# Patient Record
Sex: MALE | Race: WHITE
[De-identification: names, ages, dates, MRNs, and addresses within clinical notes are randomized per-mention and may not be internally consistent; named-entity substitution may affect disease eponyms.]

---

## 2021-04-25 ENCOUNTER — HOSPITAL ENCOUNTER (EMERGENCY)
Dept: HOSPITAL 54 - ER | Age: 66
Discharge: ANOTHER HOSPITAL | End: 2021-04-25
Payer: COMMERCIAL

## 2021-04-25 VITALS — DIASTOLIC BLOOD PRESSURE: 93 MMHG | SYSTOLIC BLOOD PRESSURE: 148 MMHG

## 2021-04-25 VITALS — BODY MASS INDEX: 24.38 KG/M2 | HEIGHT: 72 IN | WEIGHT: 180 LBS

## 2021-04-25 DIAGNOSIS — I62.01: Primary | ICD-10-CM

## 2021-04-25 DIAGNOSIS — Z20.822: ICD-10-CM

## 2021-04-25 DIAGNOSIS — R51.9: ICD-10-CM

## 2021-04-25 LAB
BASOPHILS # BLD AUTO: 0.1 /CMM (ref 0–0.2)
BASOPHILS NFR BLD AUTO: 0.8 % (ref 0–2)
BUN SERPL-MCNC: 20 MG/DL (ref 7–18)
CALCIUM SERPL-MCNC: 9.4 MG/DL (ref 8.5–10.1)
CHLORIDE SERPL-SCNC: 104 MMOL/L (ref 98–107)
CO2 SERPL-SCNC: 28 MMOL/L (ref 21–32)
CREAT SERPL-MCNC: 0.9 MG/DL (ref 0.6–1.3)
EOSINOPHIL NFR BLD AUTO: 1 % (ref 0–6)
GLUCOSE SERPL-MCNC: 104 MG/DL (ref 74–106)
HCT VFR BLD AUTO: 44 % (ref 39–51)
HGB BLD-MCNC: 14.6 G/DL (ref 13.5–17.5)
LYMPHOCYTES NFR BLD AUTO: 1.9 /CMM (ref 0.8–4.8)
LYMPHOCYTES NFR BLD AUTO: 29.3 % (ref 20–44)
MCHC RBC AUTO-ENTMCNC: 33 G/DL (ref 31–36)
MCV RBC AUTO: 89 FL (ref 80–96)
MONOCYTES NFR BLD AUTO: 0.6 /CMM (ref 0.1–1.3)
MONOCYTES NFR BLD AUTO: 9 % (ref 2–12)
NEUTROPHILS # BLD AUTO: 4 /CMM (ref 1.8–8.9)
NEUTROPHILS NFR BLD AUTO: 59.9 % (ref 43–81)
PLATELET # BLD AUTO: 278 /CMM (ref 150–450)
POTASSIUM SERPL-SCNC: 4.3 MMOL/L (ref 3.5–5.1)
RBC # BLD AUTO: 4.92 MIL/UL (ref 4.5–6)
SODIUM SERPL-SCNC: 140 MMOL/L (ref 136–145)
WBC NRBC COR # BLD AUTO: 6.6 K/UL (ref 4.3–11)

## 2021-04-25 PROCEDURE — 80048 BASIC METABOLIC PNL TOTAL CA: CPT

## 2021-04-25 PROCEDURE — 96372 THER/PROPH/DIAG INJ SC/IM: CPT

## 2021-04-25 PROCEDURE — C9803 HOPD COVID-19 SPEC COLLECT: HCPCS

## 2021-04-25 PROCEDURE — 96375 TX/PRO/DX INJ NEW DRUG ADDON: CPT

## 2021-04-25 PROCEDURE — 85025 COMPLETE CBC W/AUTO DIFF WBC: CPT

## 2021-04-25 PROCEDURE — 36415 COLL VENOUS BLD VENIPUNCTURE: CPT

## 2021-04-25 PROCEDURE — 96365 THER/PROPH/DIAG IV INF INIT: CPT

## 2021-04-25 PROCEDURE — 99291 CRITICAL CARE FIRST HOUR: CPT

## 2021-04-25 PROCEDURE — 87426 SARSCOV CORONAVIRUS AG IA: CPT

## 2021-04-25 PROCEDURE — 99292 CRITICAL CARE ADDL 30 MIN: CPT

## 2021-04-25 PROCEDURE — 70450 CT HEAD/BRAIN W/O DYE: CPT

## 2021-04-25 NOTE — NUR
TRANSFER INFORMATION:

PT ACCEPTED AT [facility]

ACCEPTING MD ENRIQUEZ

PHONE# FOR REPORT [phone]

## 2021-04-25 NOTE — NUR
Patient awake alert denies numdness to arms and legs ,no facila drooping no 
slurred speech swallow eval passed

## 2021-04-25 NOTE — NUR
Patient GCS 15 awake alert noted Left pupil 3mm reacted to light and right 
pupil 2mm reacted to light pain level 5/10 headache

## 2021-04-25 NOTE — NUR
Patient awake alert denies sob or numbness good bilateral hand grasp and good 
pedal pushes noted able to ambulated to bathroom patient aware of transfer .

## 2021-05-19 ENCOUNTER — HOSPITAL ENCOUNTER (INPATIENT)
Dept: HOSPITAL 54 - ER | Age: 66
Discharge: LEFT BEFORE DISCHARGE | DRG: 100 | End: 2021-05-19
Attending: FAMILY MEDICINE | Admitting: FAMILY MEDICINE
Payer: COMMERCIAL

## 2021-05-19 VITALS — SYSTOLIC BLOOD PRESSURE: 130 MMHG | DIASTOLIC BLOOD PRESSURE: 77 MMHG

## 2021-05-19 VITALS — WEIGHT: 170 LBS | BODY MASS INDEX: 23.03 KG/M2 | HEIGHT: 72 IN

## 2021-05-19 VITALS — DIASTOLIC BLOOD PRESSURE: 79 MMHG | SYSTOLIC BLOOD PRESSURE: 133 MMHG

## 2021-05-19 DIAGNOSIS — Z79.899: ICD-10-CM

## 2021-05-19 DIAGNOSIS — E87.2: ICD-10-CM

## 2021-05-19 DIAGNOSIS — R73.9: ICD-10-CM

## 2021-05-19 DIAGNOSIS — Z98.890: ICD-10-CM

## 2021-05-19 DIAGNOSIS — E78.5: ICD-10-CM

## 2021-05-19 DIAGNOSIS — N40.0: ICD-10-CM

## 2021-05-19 DIAGNOSIS — D72.829: ICD-10-CM
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
## 2021-05-19 DIAGNOSIS — N17.0: ICD-10-CM

## 2021-05-19 DIAGNOSIS — I10: ICD-10-CM

## 2021-05-19 DIAGNOSIS — E80.6: ICD-10-CM

## 2021-05-19 DIAGNOSIS — R56.9: Primary | ICD-10-CM

## 2021-05-19 DIAGNOSIS — E87.6: ICD-10-CM

## 2021-05-19 DIAGNOSIS — T42.75XA: ICD-10-CM

## 2021-05-19 DIAGNOSIS — Z86.73: ICD-10-CM

## 2021-05-19 LAB
ALBUMIN SERPL BCP-MCNC: 4.3 G/DL (ref 3.4–5)
ALP SERPL-CCNC: 102 U/L (ref 46–116)
ALT SERPL W P-5'-P-CCNC: 27 U/L (ref 12–78)
AST SERPL W P-5'-P-CCNC: 21 U/L (ref 15–37)
BASOPHILS # BLD AUTO: 0.1 /CMM (ref 0–0.2)
BASOPHILS NFR BLD AUTO: 0.8 % (ref 0–2)
BILIRUB DIRECT SERPL-MCNC: 0.3 MG/DL (ref 0–0.2)
BILIRUB SERPL-MCNC: 1.2 MG/DL (ref 0.2–1)
BUN SERPL-MCNC: 15 MG/DL (ref 7–18)
CALCIUM SERPL-MCNC: 9.9 MG/DL (ref 8.5–10.1)
CHLORIDE SERPL-SCNC: 100 MMOL/L (ref 98–107)
CO2 SERPL-SCNC: 14 MMOL/L (ref 21–32)
CREAT SERPL-MCNC: 1.4 MG/DL (ref 0.6–1.3)
EOSINOPHIL NFR BLD AUTO: 1.1 % (ref 0–6)
GLUCOSE SERPL-MCNC: 170 MG/DL (ref 74–106)
HCT VFR BLD AUTO: 46 % (ref 39–51)
HGB BLD-MCNC: 14.7 G/DL (ref 13.5–17.5)
LYMPHOCYTES NFR BLD AUTO: 4.7 /CMM (ref 0.8–4.8)
LYMPHOCYTES NFR BLD AUTO: 42.3 % (ref 20–44)
MCHC RBC AUTO-ENTMCNC: 32 G/DL (ref 31–36)
MCV RBC AUTO: 91 FL (ref 80–96)
MONOCYTES NFR BLD AUTO: 0.9 /CMM (ref 0.1–1.3)
MONOCYTES NFR BLD AUTO: 7.9 % (ref 2–12)
NEUTROPHILS # BLD AUTO: 5.3 /CMM (ref 1.8–8.9)
NEUTROPHILS NFR BLD AUTO: 47.9 % (ref 43–81)
PLATELET # BLD AUTO: 412 /CMM (ref 150–450)
POTASSIUM SERPL-SCNC: 3.1 MMOL/L (ref 3.5–5.1)
PROT SERPL-MCNC: 9.1 G/DL (ref 6.4–8.2)
RBC # BLD AUTO: 5.04 MIL/UL (ref 4.5–6)
SODIUM SERPL-SCNC: 141 MMOL/L (ref 136–145)
WBC NRBC COR # BLD AUTO: 11.1 K/UL (ref 4.3–11)

## 2021-05-19 PROCEDURE — G0480 DRUG TEST DEF 1-7 CLASSES: HCPCS

## 2021-05-19 PROCEDURE — C9803 HOPD COVID-19 SPEC COLLECT: HCPCS

## 2021-05-19 PROCEDURE — G0378 HOSPITAL OBSERVATION PER HR: HCPCS

## 2021-05-19 NOTE — NUR
PATIENT ADMITTED FROM ER REPORTED BY MARINA BROWN. ADMITTING DX IS SEIZURE, STABLE VITAL SIGN, 
SKIN IS WARM TO TOUCH, KEEP CLEAN/IVONE. CALL LIGHT WITHIN REACH, WILL CONTINUE TO MONITOR.

## 2021-05-19 NOTE — NUR
TELE RN NOTES

RECEIVED ON BED SLEEPING,AROUSABLE TO VERBAL STIMULI,BREATHING REGULAR,NOT IN ANY FORM OF 
DISTRESS,PRESENT IVF INFUSING WELL VIA IV PUMP ON RIGHT WRIST,SITE PATENT.SIDE RAILS PADDED 
FOR SEIZURE PRECAUTION.FALL PRECAUTION OBSERVED,BED ON LOWEST POSITION AND LOCKED,BED ALARM 
TRIGGERED.CALL LIGHT IN TREACH,NEEDS ANTICIPATED.

## 2021-05-19 NOTE — NUR
RN OPENING NOTES:



RECEIVED RESIDENT SLEEP BED COMFORTABLY,AROUSABLE TO STIMULI,  BED IN LOW POSITION, CALL 
LIGHTS WITHIN REACH, NO COMPLAIN OF PAIN AND DISCOMFORT, RESIDENT IS MONITORED FOR SEIZURE,  
NPO AT THIS TIME,  ON TELE MONITORING WITH READING OF SINUS RTHYM HR AT 82,NO COMPLAIN OF 
PAIN AND DISCOMFORT AT THIS TIME WILL CONTINUE TO MONITOR.

## 2021-05-19 NOTE — NUR
TELE RN NOTES

AWAKE THIS TIME ASKING FOR DRINKING WATER.HE WAS INFORMED THAT HE WAS PUT ON NOTHING BY 
MOUTH BY ADMITTING DOCTOR,FEELING UPSET AND INSISTING TO HAVE WATER.ALSO ASKING FOR HIS IPOD 
,MONEY, AND CELLPHONE.BELONGING LIST WAS CHECK AND NOTHING WAS DECLARED,VERIFIED WITH 
LYDIA ROOT NURSE AND SAID NOTHING WAS KEPT IN THEIR DEPARTMENT. PATIENT DECIDED TO TO GO 
HOME AGAINST MEDICAL ADVICE.

## 2021-05-19 NOTE — NUR
TELE RN NOTES

NOTED PATIENT ON NPO STATUS,WITH KEPPRA 500MG PO Q 12,HOSPITALIST EDGAR VILLEGAS MADE AWARE,WITH 
ORDER TO CHANGE IT TO IV 500MG IV Q 12,NOTED AND CARRIED OUT.

## 2021-05-19 NOTE — NUR
bib friend, pt is c/o dizziness started 1 hour ago. Patient a/ox3, breathing 
even and unlabored, verbally responsive, answer questions with delayed 
response. Patient noted to be tachycardic on the monitor.

## 2021-05-19 NOTE — NUR
PATIENT TRANSFERRED TO ROOM 307-1 VIA ACLS PROTOCOL. NO DISTRESS NOTED. IN 
STABLE CONDITION, PATIENT IS MORE AWAKE. ENDORSED TO WARD BROWN.

## 2021-05-19 NOTE — NUR
RN TELE NOTES:



PATIENT WAS IN ROOM AWAKE AND ASKING FOR WATER, INFORM PATIENT THAT HE WAS ON  NPO, AT THIS 
TIME PER DOCTORS ORDER AND WILL REACH  OUT  TO  THE DOCTOR TO SEE IF HE CAN HAVE A SIP OF 
WATER OR ICE CHIPS, PATIENT WAS UPSET TOO BECAUSE HE WAS LOOKING FOR HIS PHONE AND  IPOD 
EXPLAIN TO HIM THAT  SAID ITEM WAS NOT PART OF THE INVENTORY LIST AND HE INSIST THAT ITS 
MISSING HE SAID THAT IT WAS THE SAMEEXCUSE AS YEARDAY AND THAT WE STOLE IT, HE SAID THAT IT 
WAS WITH THE PERSON WHO BROUGHT HIM TO ER  WE ASKED HIM THE NUMBER BUT HE COULDNT RECALL 
SAYING THAT HOW CAN IREMEMBER IT WHEN HE HAD A RECENT SURGERY, NO ANY CONTACT NUMBER TO 
NEAREST KIN  ON PATIENT CHART AND HE COULDNT  REMEMBER  ANY EITHER, PATIENT WASNTS TO GO 
AMA,  NOTIFY CHARGE NURSE AND SPOKE AND EXPLAIN TO DR LALAN, DR CUMMINGS CAME TO TALK TO HIM AND 
TRY TO CONVINCE HIM THAT HE CAN STAY HERE, AND AND WE CAN ARRANGE WITH THE  ON 
HOW TO REACH OUT WITH THE FAMILY, THE PATIENT WANTED TO LEAVE, HE WAS ALERT AND ORIENTED X 
4, KNOW HIS ADDRESS,  EXPLAINED THE RISK OF LEAVING SPECIALLY HE HAS HISTORY OF SEIZURE 
BUT HE INSIST, SIGNED AMA FORM REMOVED BAND AND ESCORT THE PATIENT TO ER, AND SEND HIM TOER  
SINCE HE WANTS TO TALK TO ER STAFF ABOUT THE INFORMATION AND HIS  GADGET WHICH ACCORDING TO 
HIM WAS WITH HIM.

## 2021-05-19 NOTE — NUR
PATIENT HAD AN EPISODE OF TONIC CLONIC SEIZURE X 5 SECS. DR. GRAHAM MADE AWARE. 
SEIZURE PRECAUTION OBSERVED.

## 2021-05-19 NOTE — NUR
RN CLOSING NOTE



PATIENT IN BED RESTING, REMAINS AO X 3. SKIN IS WARM TO TOUCH, KEEP CLEAN/DRY, INTACT IV 
SITE, RUNNING NS  ML/HR CURRENTLY.  RESPIRATORY EVEN AND UNLABORED ON ROOM AIR O2SAT 
94%. KEPT ELEVATED HOB FOR ENSURE AIRWAY AND ASPIRATION PRECAUTION, ALSO LOWEST BED POSITION 
FOR SAFETY. CALL LIGHT WITHIN REACH, WILL ENDORSE NIGHT SHIFT.

## 2021-05-19 NOTE — NUR
PATIENT NOTED URINARY RETENTION  1,000 CC IN BLADDER AND OUT 1,000 CC APPROXIMATELY.  
PATIENT MENTIONED PATIENT USE COUDE CATHETER EVERY 6 HOURS AT HOME. PAGED MD BUT NO RESPONSE 
AT THIS TIME. 

LABORATORY CALLED REGARDING 2ND LACTIC ACID LEVEL IS 1.1, AND WILL CANCELL 3RD OF LACTIC 
ACID LAB. 

-------------------------------------------------------------------------------

Addendum: 05/19/21 at 1726 by IVY CONNOLLY RN

-------------------------------------------------------------------------------

PAGED DR. BASS AND CALLED BACK. INFORMED OF PATIENT'S HISTORY OF SELF-CATHETERIZATION 
USING COUDE CATHETER AND BLADDER SCAN RESULT OF >999CC W/ RECOMMENDATION TO DO STRAIGHT 
CATHETERIZATION. PER MD, OKAY TO DO STRAIGHT CATHETERIZATION TO BE PERFORMED BY NURSE BUT 
OKAY FOR PATIENT TO DO IT AS WELL AS LONG AS PATIENT IS SUPERVISED. PATIENT ASSISTED W/ 
CATHETERIZATION IN THE BATHROOM USING FR 18 COUDE CATHETER W/ OUTPUT OF APPROXIMATELY 
1000CC. PATIENT VERBALIZED BLADDER RELIEF AFTER PROCEDURE. NOTED W/ YELLOW-COLORED URINE 
OUTPUT.

## 2021-05-19 NOTE — NUR
DR. GRAHAM GAVE VERBAL ORDER FOR VALIUM. REMOVED VALIUM FROM OMNICELL, THEN DR. GRAHAM GAVE INSTRUCTION TO HOLD THE VALIUM AT THIS TIME.

## 2021-05-20 ENCOUNTER — HOSPITAL ENCOUNTER (INPATIENT)
Dept: HOSPITAL 12 - ER | Age: 66
LOS: 1 days | Discharge: HOME | DRG: 101 | End: 2021-05-21
Attending: INTERNAL MEDICINE | Admitting: INTERNAL MEDICINE
Payer: COMMERCIAL

## 2021-05-20 VITALS — SYSTOLIC BLOOD PRESSURE: 122 MMHG | DIASTOLIC BLOOD PRESSURE: 87 MMHG

## 2021-05-20 VITALS — HEIGHT: 72 IN | BODY MASS INDEX: 23.88 KG/M2 | WEIGHT: 176.31 LBS

## 2021-05-20 VITALS — DIASTOLIC BLOOD PRESSURE: 104 MMHG | SYSTOLIC BLOOD PRESSURE: 140 MMHG

## 2021-05-20 DIAGNOSIS — N40.0: ICD-10-CM

## 2021-05-20 DIAGNOSIS — R56.9: Primary | ICD-10-CM

## 2021-05-20 DIAGNOSIS — S06.5X0D: ICD-10-CM

## 2021-05-20 DIAGNOSIS — F41.9: ICD-10-CM

## 2021-05-20 DIAGNOSIS — I10: ICD-10-CM

## 2021-05-20 DIAGNOSIS — E78.5: ICD-10-CM

## 2021-05-20 DIAGNOSIS — Z20.822: ICD-10-CM
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
## 2021-05-20 DIAGNOSIS — S06.5X0S: ICD-10-CM

## 2021-05-20 DIAGNOSIS — Z87.820: ICD-10-CM
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
## 2021-05-20 DIAGNOSIS — E87.6: ICD-10-CM

## 2021-05-20 DIAGNOSIS — Z98.890: ICD-10-CM

## 2021-05-20 LAB
BASOPHILS # BLD AUTO: 0.1 K/UL (ref 0–8)
BASOPHILS NFR BLD AUTO: 0.6 % (ref 0–2)
BUN SERPL-MCNC: 12 MG/DL (ref 7–18)
CHLORIDE SERPL-SCNC: 104 MMOL/L (ref 98–107)
CO2 SERPL-SCNC: 23 MMOL/L (ref 21–32)
CREAT SERPL-MCNC: 1 MG/DL (ref 0.6–1.3)
EOSINOPHIL # BLD AUTO: 0 K/UL (ref 0–0.7)
EOSINOPHIL NFR BLD AUTO: 0.4 % (ref 0–7)
GLUCOSE SERPL-MCNC: 120 MG/DL (ref 74–106)
HCT VFR BLD AUTO: 38.3 % (ref 36.7–47.1)
HGB BLD-MCNC: 13 G/DL (ref 12.5–16.3)
LYMPHOCYTES # BLD AUTO: 1.2 K/UL (ref 20–40)
LYMPHOCYTES NFR BLD AUTO: 14.7 % (ref 20.5–51.5)
MCH RBC QN AUTO: 29.4 UUG (ref 23.8–33.4)
MCHC RBC AUTO-ENTMCNC: 34 G/DL (ref 32.5–36.3)
MCV RBC AUTO: 87 FL (ref 73–96.2)
MONOCYTES # BLD AUTO: 0.7 K/UL (ref 2–10)
MONOCYTES NFR BLD AUTO: 8.9 % (ref 0–11)
NEUTROPHILS # BLD AUTO: 6.3 K/UL (ref 1.8–8.9)
NEUTROPHILS NFR BLD AUTO: 75.4 % (ref 38.5–71.5)
PLATELET # BLD AUTO: 264 K/UL (ref 152–348)
POTASSIUM SERPL-SCNC: 3.2 MMOL/L (ref 3.5–5.1)
RBC # BLD AUTO: 4.4 MIL/UL (ref 4.06–5.63)
WBC # BLD AUTO: 8.4 K/UL (ref 3.6–10.2)

## 2021-05-20 PROCEDURE — G0378 HOSPITAL OBSERVATION PER HR: HCPCS

## 2021-05-20 PROCEDURE — A4663 DIALYSIS BLOOD PRESSURE CUFF: HCPCS

## 2021-05-20 RX ADMIN — Medication SCH MG: at 19:01

## 2021-05-21 VITALS — DIASTOLIC BLOOD PRESSURE: 80 MMHG | SYSTOLIC BLOOD PRESSURE: 129 MMHG

## 2021-05-21 VITALS — DIASTOLIC BLOOD PRESSURE: 85 MMHG | SYSTOLIC BLOOD PRESSURE: 140 MMHG

## 2021-05-21 VITALS — DIASTOLIC BLOOD PRESSURE: 93 MMHG | SYSTOLIC BLOOD PRESSURE: 133 MMHG

## 2021-05-21 LAB
ALP SERPL-CCNC: 96 U/L (ref 50–136)
ALT SERPL W/O P-5'-P-CCNC: 31 U/L (ref 16–63)
AST SERPL-CCNC: 41 U/L (ref 15–37)
BASOPHILS # BLD AUTO: 0.1 K/UL (ref 0–8)
BASOPHILS NFR BLD AUTO: 0.7 % (ref 0–2)
BILIRUB SERPL-MCNC: 1.2 MG/DL (ref 0.2–1)
BUN SERPL-MCNC: 14 MG/DL (ref 7–18)
CHLORIDE SERPL-SCNC: 104 MMOL/L (ref 98–107)
CHOLEST SERPL-MCNC: 132 MG/DL (ref ?–200)
CO2 SERPL-SCNC: 29 MMOL/L (ref 21–32)
CREAT SERPL-MCNC: 1.1 MG/DL (ref 0.6–1.3)
EOSINOPHIL # BLD AUTO: 0.1 K/UL (ref 0–0.7)
EOSINOPHIL NFR BLD AUTO: 1.3 % (ref 0–7)
GLUCOSE SERPL-MCNC: 126 MG/DL (ref 74–106)
HCT VFR BLD AUTO: 43.3 % (ref 36.7–47.1)
HDLC SERPL-MCNC: 57 MG/DL (ref 40–60)
HGB BLD-MCNC: 14.5 G/DL (ref 12.5–16.3)
LYMPHOCYTES # BLD AUTO: 1.6 K/UL (ref 20–40)
LYMPHOCYTES NFR BLD AUTO: 19.7 % (ref 20.5–51.5)
MAGNESIUM SERPL-MCNC: 2.3 MG/DL (ref 1.8–2.4)
MCH RBC QN AUTO: 29.5 UUG (ref 23.8–33.4)
MCHC RBC AUTO-ENTMCNC: 34 G/DL (ref 32.5–36.3)
MCV RBC AUTO: 87.7 FL (ref 73–96.2)
MONOCYTES # BLD AUTO: 0.8 K/UL (ref 2–10)
MONOCYTES NFR BLD AUTO: 10.4 % (ref 0–11)
NEUTROPHILS # BLD AUTO: 5.5 K/UL (ref 1.8–8.9)
NEUTROPHILS NFR BLD AUTO: 67.9 % (ref 38.5–71.5)
PHOSPHATE SERPL-MCNC: 4.6 MG/DL (ref 2.5–4.9)
PLATELET # BLD AUTO: 313 K/UL (ref 152–348)
POTASSIUM SERPL-SCNC: 3.9 MMOL/L (ref 3.5–5.1)
RBC # BLD AUTO: 4.93 MIL/UL (ref 4.06–5.63)
TRIGL SERPL-MCNC: 41 MG/DL (ref 30–150)
TSH SERPL DL<=0.005 MIU/L-ACNC: 0.87 MIU/ML (ref 0.36–3.74)
WBC # BLD AUTO: 8.1 K/UL (ref 3.6–10.2)
WS STN SPEC: 8.5 G/DL (ref 6.4–8.2)

## 2021-05-21 RX ADMIN — Medication SCH MG: at 09:20
